# Patient Record
Sex: MALE | Race: WHITE | NOT HISPANIC OR LATINO | Employment: FULL TIME | URBAN - METROPOLITAN AREA
[De-identification: names, ages, dates, MRNs, and addresses within clinical notes are randomized per-mention and may not be internally consistent; named-entity substitution may affect disease eponyms.]

---

## 2017-01-16 ENCOUNTER — ANESTHESIA (OUTPATIENT)
Dept: GASTROENTEROLOGY | Facility: HOSPITAL | Age: 52
End: 2017-01-16

## 2017-01-16 ENCOUNTER — ANESTHESIA EVENT (OUTPATIENT)
Dept: GASTROENTEROLOGY | Facility: HOSPITAL | Age: 52
End: 2017-01-16

## 2017-01-16 PROCEDURE — 25010000002 PROPOFOL 10 MG/ML EMULSION: Performed by: ANESTHESIOLOGY

## 2017-01-16 RX ORDER — LIDOCAINE HYDROCHLORIDE 20 MG/ML
INJECTION, SOLUTION INFILTRATION; PERINEURAL AS NEEDED
Status: DISCONTINUED | OUTPATIENT
Start: 2017-01-16 | End: 2017-01-16 | Stop reason: SURG

## 2017-01-16 RX ORDER — PROPOFOL 10 MG/ML
VIAL (ML) INTRAVENOUS AS NEEDED
Status: DISCONTINUED | OUTPATIENT
Start: 2017-01-16 | End: 2017-01-16 | Stop reason: SURG

## 2017-01-16 RX ADMIN — PROPOFOL 200 MG: 10 INJECTION, EMULSION INTRAVENOUS at 09:32

## 2017-01-16 RX ADMIN — PROPOFOL 100 MG: 10 INJECTION, EMULSION INTRAVENOUS at 09:45

## 2017-01-16 RX ADMIN — LIDOCAINE HYDROCHLORIDE 40 MG: 20 INJECTION, SOLUTION INFILTRATION; PERINEURAL at 09:32

## 2017-01-16 RX ADMIN — SODIUM CHLORIDE, POTASSIUM CHLORIDE, SODIUM LACTATE AND CALCIUM CHLORIDE: 600; 310; 30; 20 INJECTION, SOLUTION INTRAVENOUS at 09:32

## 2017-01-16 NOTE — ANESTHESIA POSTPROCEDURE EVALUATION
Patient: Kamaljit Sanabria    Procedure Summary     Date Anesthesia Start Anesthesia Stop Room / Location    01/16/17 0932 0952  [facility] /  [facility]       Procedure Diagnosis Surgeon Provider    COLONOSCOPY (N/A ) No diagnosis on file. MD Esequiel Chacon MD          Anesthesia Type: MAC  Last vitals  /92 (01/16/17 0911)    Temp      Pulse 89 (01/16/17 0911)   Resp 16 (01/16/17 0911)    SpO2 99 % (01/16/17 0911)      Post Anesthesia Care and Evaluation    Patient location during evaluation: PACU  Patient participation: complete - patient participated  Level of consciousness: awake and alert  Pain management: adequate  Airway patency: patent  Anesthetic complications: No anesthetic complications    Cardiovascular status: acceptable  Respiratory status: acceptable  Hydration status: acceptable

## 2017-01-16 NOTE — ANESTHESIA PREPROCEDURE EVALUATION
Anesthesia Evaluation     Patient summary reviewed and Nursing notes reviewed    Airway   Mallampati: II  TM distance: <3 FB  Neck ROM: full  no difficulty expected  Dental - normal exam     Pulmonary - normal exam   (+) hx of smoking,   Cardiovascular - negative cardio ROS and normal exam    Neuro/Psych- negative ROS  GI/Hepatic/Renal/Endo - negative ROS     Musculoskeletal (-) negative ROS    Abdominal  - normal exam    Bowel sounds: normal.   Substance History - negative use     OB/GYN negative ob/gyn ROS         Other                             Anesthesia Plan    ASA 2     MAC     Anesthetic plan and risks discussed with patient.

## 2021-03-30 ENCOUNTER — BULK ORDERING (OUTPATIENT)
Dept: CASE MANAGEMENT | Facility: OTHER | Age: 56
End: 2021-03-30

## 2021-03-30 DIAGNOSIS — Z23 IMMUNIZATION DUE: ICD-10-CM

## 2022-05-23 ENCOUNTER — ANESTHESIA EVENT (OUTPATIENT)
Dept: GASTROENTEROLOGY | Facility: HOSPITAL | Age: 57
End: 2022-05-23

## 2022-05-23 ENCOUNTER — ANESTHESIA (OUTPATIENT)
Dept: GASTROENTEROLOGY | Facility: HOSPITAL | Age: 57
End: 2022-05-23

## 2022-05-23 ENCOUNTER — HOSPITAL ENCOUNTER (OUTPATIENT)
Facility: HOSPITAL | Age: 57
Setting detail: HOSPITAL OUTPATIENT SURGERY
Discharge: HOME | End: 2022-05-23
Attending: SURGERY | Admitting: SURGERY

## 2022-05-23 VITALS
HEIGHT: 69 IN | OXYGEN SATURATION: 98 % | HEART RATE: 65 BPM | RESPIRATION RATE: 16 BRPM | SYSTOLIC BLOOD PRESSURE: 114 MMHG | BODY MASS INDEX: 32.58 KG/M2 | DIASTOLIC BLOOD PRESSURE: 71 MMHG | WEIGHT: 220 LBS

## 2022-05-23 DIAGNOSIS — Z83.79 FAMILY HISTORY OF ULCERATIVE COLITIS: ICD-10-CM

## 2022-05-23 PROCEDURE — 88305 TISSUE EXAM BY PATHOLOGIST: CPT | Performed by: SURGERY

## 2022-05-23 PROCEDURE — 25010000002 PROPOFOL 10 MG/ML EMULSION: Performed by: ANESTHESIOLOGY

## 2022-05-23 RX ORDER — SODIUM CHLORIDE, SODIUM LACTATE, POTASSIUM CHLORIDE, CALCIUM CHLORIDE 600; 310; 30; 20 MG/100ML; MG/100ML; MG/100ML; MG/100ML
30 INJECTION, SOLUTION INTRAVENOUS CONTINUOUS PRN
Status: DISCONTINUED | OUTPATIENT
Start: 2022-05-23 | End: 2022-05-23 | Stop reason: HOSPADM

## 2022-05-23 RX ORDER — SODIUM CHLORIDE 0.9 % (FLUSH) 0.9 %
10 SYRINGE (ML) INJECTION EVERY 12 HOURS SCHEDULED
Status: DISCONTINUED | OUTPATIENT
Start: 2022-05-23 | End: 2022-05-23 | Stop reason: HOSPADM

## 2022-05-23 RX ORDER — SODIUM CHLORIDE 0.9 % (FLUSH) 0.9 %
10 SYRINGE (ML) INJECTION AS NEEDED
Status: DISCONTINUED | OUTPATIENT
Start: 2022-05-23 | End: 2022-05-23 | Stop reason: HOSPADM

## 2022-05-23 RX ORDER — ZOLPIDEM TARTRATE 10 MG/1
10 TABLET ORAL NIGHTLY PRN
COMMUNITY

## 2022-05-23 RX ORDER — PROPOFOL 10 MG/ML
VIAL (ML) INTRAVENOUS CONTINUOUS PRN
Status: DISCONTINUED | OUTPATIENT
Start: 2022-05-23 | End: 2022-05-23 | Stop reason: SURG

## 2022-05-23 RX ORDER — PROPOFOL 10 MG/ML
VIAL (ML) INTRAVENOUS AS NEEDED
Status: DISCONTINUED | OUTPATIENT
Start: 2022-05-23 | End: 2022-05-23 | Stop reason: SURG

## 2022-05-23 RX ORDER — LIDOCAINE HYDROCHLORIDE 20 MG/ML
INJECTION, SOLUTION INFILTRATION; PERINEURAL AS NEEDED
Status: DISCONTINUED | OUTPATIENT
Start: 2022-05-23 | End: 2022-05-23 | Stop reason: SURG

## 2022-05-23 RX ADMIN — PROPOFOL 75 MG: 10 INJECTION, EMULSION INTRAVENOUS at 09:06

## 2022-05-23 RX ADMIN — Medication 125 MCG/KG/MIN: at 09:06

## 2022-05-23 RX ADMIN — LIDOCAINE HYDROCHLORIDE 50 MG: 20 INJECTION, SOLUTION INFILTRATION; PERINEURAL at 09:05

## 2022-05-23 RX ADMIN — SODIUM CHLORIDE, POTASSIUM CHLORIDE, SODIUM LACTATE AND CALCIUM CHLORIDE 30 ML/HR: 600; 310; 30; 20 INJECTION, SOLUTION INTRAVENOUS at 08:15

## 2022-05-23 NOTE — ANESTHESIA PREPROCEDURE EVALUATION
Anesthesia Evaluation     Patient summary reviewed                Airway   No difficulty expected  Dental      Pulmonary    Cardiovascular     Rhythm: regular    (+) hypertension, hyperlipidemia,       Neuro/Psych  GI/Hepatic/Renal/Endo      Musculoskeletal     Abdominal    Substance History      OB/GYN          Other                        Anesthesia Plan    ASA 2     MAC       Anesthetic plan, all risks, benefits, and alternatives have been provided, discussed and informed consent has been obtained with: patient.        CODE STATUS:

## 2022-05-23 NOTE — H&P
"Patient  Name SHAHEEN SMYTH (56yo, M) ID# [id number] Service Dept. Main Office  Provider PRUDENCIO FORD MD  Insurance   Med Primary: [facility] ([facility])  Insurance # : [id number]  Policy/Group # : [id number]  Prescription: [facility] - Member is eligible. details    Chief Complaint  rectal bleeding    Patient's Care Team  Primary Care Provider: BRANDY GRESHAM JR, MD: [id number] [facility] [address], [place] [address], Ph [phone], Fax [phone] NPI: [id number]    Vitals  AF/VSS  Ht: 5 ft 9 in  Wt: 220 lbs  BMI: 32.5    Allergies  Reviewed Allergies  NKDA    Medications  Reviewed Medications  atorvastatin 20 mg tablet  Take 1 tablet(s) every day by oral route.  04/29/22   entered Prudencio Ford MD  omeprazole 20 mg capsule,delayed release  Take 1 capsule(s) every day by oral route.  04/29/22   entered Prudencio Ford MD  sertraline 25 mg tablet  Take 1 tablet(s) every day by oral route.  04/29/22   entered Prudencio Ford MD  tamsulosin 0.4 mg capsule  Take 2 capsule(s) every day by oral route.  04/29/22   entered Prudencio Ford MD  zolpidem 5 mg tablet  Take 1 tablet(s) every day by oral route.  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 He had a similar issue back in January of this year that was bad enough that after several hours he had a syncopal episode and ended up in the ER and had a transfusion of 2 units of blood for symptomatic anemia. This time, however, he has not had any of those associated complaints but it was obviously concerning to him. I did his last colonoscopy 5 years ago that was for screening and he was found to have nonspecific self-limited colitis but I have not heard from him since. He reports that he has not had trouble since then. He takes fiber daily and has up until about 2 weeks ago. He does not take nonsteroidals anymore but he was taking them for his back back when the episode happened earlier this year. He has a known history of reflux that is well controlled with over-the-counter medication. He denies weight loss. He is not had abdominal pain. He has no family history of cancer but he does have a sibling that has ulcerative colitis.    ROS  Patient reports lethargy but reports no fever, no significant weight loss, and no chills. He reports no chest pain and no palpitations; no syncope. He reports dyspepsia and GERD but reports no abdominal pain, no nausea, no vomiting, no constipation, normal appetite, and no diarrhea. He reports blood in stools but reports no difficulty swallowing. He reports increased urinary frequency but reports no incontinence, no difficulty urinating, and no hematuria. He reports arthralgias/joint pain but reports no back pain and no neck pain. He reports no vision change. He reports no difficulty hearing. He reports no sore throat. He reports no wheezing and no shortness of breath. He reports no jaundice and no rashes. He reports no numbness, no dizziness, no headaches, and no tremor. He  reports no fatigue. He reports no swollen glands, no bruising, and no excessive bleeding.    Physical Exam  Constitutional: General Appearance: healthy-appearing.    Eyes: Pupils: PERRLA.    Neck: Neck: supple.    Lungs: Auscultation: breath sounds normal.    Cardiovascular: Heart Auscultation: RRR.    Abdomen: Inspection and Palpation: no masses or tenderness (no guarding, no rebound) and soft and non-distended.    Musculoskeletal:: Joints, Bones, and Muscles: normal movement of all extremities.    Rectal Exam: Rectum: Deferred to colonoscopy.    Assessment / Plan  Patient presents with rectal bleeding that is painless, not associated with any constitutional complaints and is similar to an episode that was bad enough he required hospital admission and transfusion 3 months ago. He also has an interesting history of nonspecific colitis diagnosed 5 years ago from which she has no trouble or complaints since then and a brother that has ulcerative colitis. I recommended definitive follow-up diagnostic colonoscopy and we will schedule that at his convenience. Further recommendations will follow. He is to call me if he has any setbacks or problems before that scheduled procedure.    1. Painless rectal bleeding  K62.5: Hemorrhage of anus and rectum    2. Obesity  E66.9: Obesity, unspecified  DIET WEIGHT LOSS      Return to Office  Patient will return to the office as needed.  Encounter Sign-Off  Encounter signed-off by Tez Ford MD

## 2022-05-23 NOTE — ANESTHESIA POSTPROCEDURE EVALUATION
Patient: Kamaljit Sanabria Jr.    Procedure Summary     Date: 05/23/22 Room / Location: [facility] / [facility]    Anesthesia Start: 0903 Anesthesia Stop: 0923    Procedure: COLONOSCOPY into cecum with biopsy (N/A ) Diagnosis:     Surgeons: Tez Ford MD Provider: Hari Miller MD    Anesthesia Type: MAC ASA Status: 2          Anesthesia Type: MAC    Vitals  Vitals Value Taken Time   /71 05/23/22 0950   Temp     Pulse 65 05/23/22 0950   Resp 16 05/23/22 0950   SpO2 98 % 05/23/22 0950           Post Anesthesia Care and Evaluation    Patient location during evaluation: PHASE II  Patient participation: complete - patient participated  Level of consciousness: awake  Pain score: 1  Pain management: adequate  Anesthetic complications: No anesthetic complications    Cardiovascular status: acceptable  Respiratory status: acceptable  Hydration status: acceptable

## 2022-05-24 LAB
LAB AP CASE REPORT: NORMAL
PATH REPORT.FINAL DX SPEC: NORMAL
PATH REPORT.GROSS SPEC: NORMAL

## (undated) DEVICE — ADAPT CLN BIOGUARD AIR/H2O DISP

## (undated) DEVICE — SINGLE-USE BIOPSY FORCEPS: Brand: RADIAL JAW 4

## (undated) DEVICE — CANN O2 ETCO2 FITS ALL CONN CO2 SMPL A/ 7IN DISP LF

## (undated) DEVICE — LN SMPL CO2 SHTRM SD STREAM W/M LUER

## (undated) DEVICE — TUBING, SUCTION, 1/4" X 10', STRAIGHT: Brand: MEDLINE

## (undated) DEVICE — KT ORCA ORCAPOD DISP STRL

## (undated) DEVICE — SENSR O2 OXIMAX FNGR A/ 18IN NONSTR